# Patient Record
Sex: FEMALE | Race: WHITE | NOT HISPANIC OR LATINO | URBAN - METROPOLITAN AREA
[De-identification: names, ages, dates, MRNs, and addresses within clinical notes are randomized per-mention and may not be internally consistent; named-entity substitution may affect disease eponyms.]

---

## 2021-08-28 ENCOUNTER — TELEPHONE ENCOUNTER (OUTPATIENT)
Dept: URBAN - METROPOLITAN AREA CLINIC 13 | Facility: CLINIC | Age: 61
End: 2021-08-28

## 2021-08-29 ENCOUNTER — TELEPHONE ENCOUNTER (OUTPATIENT)
Dept: URBAN - METROPOLITAN AREA CLINIC 13 | Facility: CLINIC | Age: 61
End: 2021-08-29

## 2023-01-19 ENCOUNTER — CLAIMS CREATED FROM THE CLAIM WINDOW (OUTPATIENT)
Dept: URBAN - METROPOLITAN AREA CLINIC 48 | Facility: CLINIC | Age: 63
End: 2023-01-19
Payer: OTHER GOVERNMENT

## 2023-01-19 ENCOUNTER — LAB OUTSIDE AN ENCOUNTER (OUTPATIENT)
Dept: URBAN - METROPOLITAN AREA CLINIC 48 | Facility: CLINIC | Age: 63
End: 2023-01-19

## 2023-01-19 VITALS
DIASTOLIC BLOOD PRESSURE: 81 MMHG | WEIGHT: 197.2 LBS | HEIGHT: 67 IN | OXYGEN SATURATION: 96 % | HEART RATE: 78 BPM | SYSTOLIC BLOOD PRESSURE: 145 MMHG | TEMPERATURE: 97.3 F | BODY MASS INDEX: 30.95 KG/M2

## 2023-01-19 DIAGNOSIS — K21.9 GASTROESOPHAGEAL REFLUX DISEASE WITHOUT ESOPHAGITIS: ICD-10-CM

## 2023-01-19 DIAGNOSIS — Z12.11 COLON CANCER SCREENING: ICD-10-CM

## 2023-01-19 DIAGNOSIS — Z83.79 FAMILY HISTORY OF CELIAC DISEASE: ICD-10-CM

## 2023-01-19 PROBLEM — 79922009: Status: ACTIVE | Noted: 2023-01-19

## 2023-01-19 PROBLEM — 266435005: Status: ACTIVE | Noted: 2023-01-19

## 2023-01-19 PROBLEM — 430329007: Status: ACTIVE | Noted: 2023-01-19

## 2023-01-19 PROCEDURE — 99203 OFFICE O/P NEW LOW 30 MIN: CPT | Performed by: INTERNAL MEDICINE

## 2023-01-19 RX ORDER — ERGOCALCIFEROL 1.25 MG/1
TAKE 1 CAPSULE BY MOUTH ONE TIME PER WEEK CAPSULE, LIQUID FILLED ORAL
Qty: 12 EACH | Refills: 1 | Status: ACTIVE | COMMUNITY

## 2023-01-19 RX ORDER — LEVOTHYROXINE SODIUM 88 UG/1
1 TABLET IN THE MORNING ON AN EMPTY STOMACH TABLET ORAL ONCE A DAY
Qty: 90 TABLET | Refills: 0 | Status: ACTIVE | COMMUNITY

## 2023-01-19 RX ORDER — ASPIRIN 81 MG/1
1 TABLET TABLET, COATED ORAL ONCE A DAY
Status: ACTIVE | COMMUNITY

## 2023-01-19 RX ORDER — FAMOTIDINE 20 MG/1
1 TABLET DAILY AS NEEDED FOR REFLUX TABLET, FILM COATED ORAL ONCE A DAY
Qty: 30 TABLET | Refills: 3 | OUTPATIENT

## 2023-01-19 RX ORDER — BACILLUS COAGULANS/INULIN 1B-250 MG
AS DIRECTED CAPSULE ORAL
Status: ACTIVE | COMMUNITY

## 2023-01-19 RX ORDER — ATORVASTATIN CALCIUM, FILM COATED 20 MG/1
TAKE 1 TABLET (20 MG TOTAL) BY MOUTH IN THE MORNING TABLET ORAL
Qty: 90 EACH | Refills: 1 | Status: ACTIVE | COMMUNITY

## 2023-01-19 RX ORDER — FLUTICASONE PROPIONATE 50 UG/1
SPRAY, METERED NASAL
Qty: 48 GRAM | Status: ACTIVE | COMMUNITY

## 2023-01-19 RX ORDER — ESTRADIOL 10 UG/1
_INSERT 1 TABLET VAGINALLY TWO TIMES A WEEK TABLET VAGINAL
Qty: 24 EACH | Refills: 2 | Status: ACTIVE | COMMUNITY

## 2023-01-19 RX ORDER — AMLODIPINE AND VALSARTAN 5; 160 MG/1; MG/1
TABLET, FILM COATED ORAL
Qty: 90 TABLET | Status: ACTIVE | COMMUNITY

## 2023-01-19 NOTE — HPI-TODAY'S VISIT:
62 year old presents for a colon consult. Bowel movements are daily of formed, non-bloody stool. The patient denies blood in stool, weight changes, or abdominal pain. She occasionally takes probiotics. Last colonoscopy was 7 years ago and prior colonoscopy was normal. In 2003, she had a tubulovillous adenoma. She occasionally has GERD for which she takes TUMS. Her Vitamin D is low and is on VItamin D weekly. In 2007 she had a negative  biopsy for Celiac. 12/2022 CBC and CMP were unremarkable. Her mother has Celiac and diverticulosis.

## 2023-01-20 PROBLEM — 305058001: Status: ACTIVE | Noted: 2023-01-19

## 2023-01-30 ENCOUNTER — TELEPHONE ENCOUNTER (OUTPATIENT)
Dept: URBAN - METROPOLITAN AREA CLINIC 48 | Facility: CLINIC | Age: 63
End: 2023-01-30

## 2023-02-01 ENCOUNTER — OFFICE VISIT (OUTPATIENT)
Dept: URBAN - METROPOLITAN AREA SURGERY CENTER 28 | Facility: SURGERY CENTER | Age: 63
End: 2023-02-01
Payer: OTHER GOVERNMENT

## 2023-02-01 ENCOUNTER — CLAIMS CREATED FROM THE CLAIM WINDOW (OUTPATIENT)
Dept: URBAN - METROPOLITAN AREA CLINIC 4 | Facility: CLINIC | Age: 63
End: 2023-02-01
Payer: OTHER GOVERNMENT

## 2023-02-01 DIAGNOSIS — D12.3 ADENOMA OF TRANSVERSE COLON: ICD-10-CM

## 2023-02-01 DIAGNOSIS — D12.3 BENIGN NEOPLASM OF TRANSVERSE COLON: ICD-10-CM

## 2023-02-01 DIAGNOSIS — D12.7 ADENOMA DETERMINED BY COLORECTAL BIOPSY: ICD-10-CM

## 2023-02-01 DIAGNOSIS — Z86.010 ADENOMAS PERSONAL HISTORY OF COLONIC POLYPS: ICD-10-CM

## 2023-02-01 PROCEDURE — G8907 PT DOC NO EVENTS ON DISCHARG: HCPCS | Performed by: INTERNAL MEDICINE

## 2023-02-01 PROCEDURE — 45380 COLONOSCOPY AND BIOPSY: CPT | Performed by: INTERNAL MEDICINE

## 2023-02-01 PROCEDURE — 88305 TISSUE EXAM BY PATHOLOGIST: CPT | Performed by: PATHOLOGY

## 2023-02-01 RX ORDER — ATORVASTATIN CALCIUM, FILM COATED 20 MG/1
TAKE 1 TABLET (20 MG TOTAL) BY MOUTH IN THE MORNING TABLET ORAL
Qty: 90 EACH | Refills: 1 | Status: ACTIVE | COMMUNITY

## 2023-02-01 RX ORDER — ERGOCALCIFEROL 1.25 MG/1
TAKE 1 CAPSULE BY MOUTH ONE TIME PER WEEK CAPSULE, LIQUID FILLED ORAL
Qty: 12 EACH | Refills: 1 | Status: ACTIVE | COMMUNITY

## 2023-02-01 RX ORDER — BACILLUS COAGULANS/INULIN 1B-250 MG
AS DIRECTED CAPSULE ORAL
Status: ACTIVE | COMMUNITY

## 2023-02-01 RX ORDER — ASPIRIN 81 MG/1
1 TABLET TABLET, COATED ORAL ONCE A DAY
Status: ACTIVE | COMMUNITY

## 2023-02-01 RX ORDER — LEVOTHYROXINE SODIUM 88 UG/1
1 TABLET IN THE MORNING ON AN EMPTY STOMACH TABLET ORAL ONCE A DAY
Qty: 90 TABLET | Refills: 0 | Status: ACTIVE | COMMUNITY

## 2023-02-01 RX ORDER — FLUTICASONE PROPIONATE 50 UG/1
SPRAY, METERED NASAL
Qty: 48 GRAM | Status: ACTIVE | COMMUNITY

## 2023-02-01 RX ORDER — AMLODIPINE AND VALSARTAN 5; 160 MG/1; MG/1
TABLET, FILM COATED ORAL
Qty: 90 TABLET | Status: ACTIVE | COMMUNITY

## 2023-02-01 RX ORDER — FAMOTIDINE 20 MG/1
1 TABLET DAILY AS NEEDED FOR REFLUX TABLET, FILM COATED ORAL ONCE A DAY
Qty: 30 TABLET | Refills: 3 | Status: ACTIVE | COMMUNITY

## 2023-02-01 RX ORDER — ESTRADIOL 10 UG/1
_INSERT 1 TABLET VAGINALLY TWO TIMES A WEEK TABLET VAGINAL
Qty: 24 EACH | Refills: 2 | Status: ACTIVE | COMMUNITY

## 2023-02-06 LAB
IMMUNOGLOBULIN A, QN, SERUM: 99
INTERPRETATION: (no result)
T-TRANSGLUTAMINASE (TTG) IGA: <1

## 2023-02-16 ENCOUNTER — ERX REFILL RESPONSE (OUTPATIENT)
Dept: URBAN - METROPOLITAN AREA CLINIC 44 | Facility: CLINIC | Age: 63
End: 2023-02-16

## 2023-02-16 RX ORDER — FAMOTIDINE 20 MG/1
1 TABLET DAILY AS NEEDED FOR REFLUX TABLET, FILM COATED ORAL ONCE A DAY
Qty: 30 TABLET | Refills: 3 | OUTPATIENT

## 2023-02-16 RX ORDER — FAMOTIDINE 20 MG/1
TAKE 1 TABLET BY MOUTH DAILY AS NEEDED FOR REFLUX TABLET, FILM COATED ORAL
Qty: 90 TABLET | Refills: 2 | OUTPATIENT

## 2024-02-28 ENCOUNTER — LAB (OUTPATIENT)
Dept: URBAN - METROPOLITAN AREA CLINIC 48 | Facility: CLINIC | Age: 64
End: 2024-02-28

## 2024-02-28 ENCOUNTER — OV EP (OUTPATIENT)
Dept: URBAN - METROPOLITAN AREA CLINIC 48 | Facility: CLINIC | Age: 64
End: 2024-02-28
Payer: OTHER GOVERNMENT

## 2024-02-28 VITALS
DIASTOLIC BLOOD PRESSURE: 77 MMHG | SYSTOLIC BLOOD PRESSURE: 131 MMHG | HEIGHT: 67 IN | HEART RATE: 60 BPM | WEIGHT: 189.8 LBS | TEMPERATURE: 97.3 F | BODY MASS INDEX: 29.79 KG/M2

## 2024-02-28 DIAGNOSIS — K44.9 HIATAL HERNIA: ICD-10-CM

## 2024-02-28 DIAGNOSIS — K21.9 GASTROESOPHAGEAL REFLUX DISEASE WITHOUT ESOPHAGITIS: ICD-10-CM

## 2024-02-28 DIAGNOSIS — Z83.719 FAMILY HX COLONIC POLYPS: ICD-10-CM

## 2024-02-28 DIAGNOSIS — Z86.010 PERSONAL HISTORY OF COLONIC POLYPS: ICD-10-CM

## 2024-02-28 PROBLEM — 84089009: Status: ACTIVE | Noted: 2024-02-28

## 2024-02-28 PROBLEM — 428283002: Status: ACTIVE | Noted: 2024-02-28

## 2024-02-28 PROBLEM — 429969003: Status: ACTIVE | Noted: 2024-02-28

## 2024-02-28 PROCEDURE — 99214 OFFICE O/P EST MOD 30 MIN: CPT | Performed by: INTERNAL MEDICINE

## 2024-02-28 RX ORDER — ATORVASTATIN CALCIUM, FILM COATED 20 MG/1
TAKE 1 TABLET (20 MG TOTAL) BY MOUTH IN THE MORNING TABLET ORAL
Qty: 90 EACH | Refills: 1 | Status: ACTIVE | COMMUNITY

## 2024-02-28 RX ORDER — AMLODIPINE AND VALSARTAN 5; 160 MG/1; MG/1
TABLET, FILM COATED ORAL
Qty: 90 TABLET | Status: ACTIVE | COMMUNITY

## 2024-02-28 RX ORDER — FLUTICASONE PROPIONATE 50 UG/1
SPRAY, METERED NASAL
Qty: 48 GRAM | Status: ACTIVE | COMMUNITY

## 2024-02-28 RX ORDER — ASPIRIN 81 MG/1
1 TABLET TABLET, COATED ORAL ONCE A DAY
Status: ACTIVE | COMMUNITY

## 2024-02-28 RX ORDER — FAMOTIDINE 20 MG/1
1 TABLET TABLET, FILM COATED ORAL ONCE A DAY
Qty: 90 TABLET | Refills: 3 | OUTPATIENT
Start: 2024-02-28

## 2024-02-28 RX ORDER — FAMOTIDINE 20 MG/1
TAKE 1 TABLET BY MOUTH DAILY AS NEEDED FOR REFLUX TABLET, FILM COATED ORAL
Qty: 90 TABLET | Refills: 2 | Status: ACTIVE | COMMUNITY

## 2024-02-28 RX ORDER — BACILLUS COAGULANS/INULIN 1B-250 MG
AS DIRECTED CAPSULE ORAL
Status: ACTIVE | COMMUNITY

## 2024-02-28 RX ORDER — LEVOTHYROXINE SODIUM 88 UG/1
1 TABLET IN THE MORNING ON AN EMPTY STOMACH TABLET ORAL ONCE A DAY
Qty: 90 TABLET | Refills: 0 | Status: ACTIVE | COMMUNITY

## 2024-02-28 RX ORDER — ERGOCALCIFEROL 1.25 MG/1
TAKE 1 CAPSULE BY MOUTH ONE TIME PER WEEK CAPSULE, LIQUID FILLED ORAL
Qty: 12 EACH | Refills: 1 | Status: ACTIVE | COMMUNITY

## 2024-02-28 NOTE — HPI-TODAY'S VISIT:
63 year old female presents for evaluation of reflux. She has had GERD for 20 years but it has worsened recently despite Famotidine 20mg. She is not always taking it daily. TUMS is not helping. The patient has tried PPI but has early stage of chronic renal disease so she tries to avoid PPIs. Last EGD was in 2014 and showed a small hiatal hernia. Colonoscopy 1/2023 and showed diverticulosis, internal hemorrhoids, and adenomatous polyps. 5 year recall.

## 2024-03-15 ENCOUNTER — EGD (OUTPATIENT)
Dept: URBAN - METROPOLITAN AREA SURGERY CENTER 27 | Facility: SURGERY CENTER | Age: 64
End: 2024-03-15